# Patient Record
Sex: MALE | Race: WHITE | URBAN - METROPOLITAN AREA
[De-identification: names, ages, dates, MRNs, and addresses within clinical notes are randomized per-mention and may not be internally consistent; named-entity substitution may affect disease eponyms.]

---

## 2020-09-29 ENCOUNTER — HISTORICAL (OUTPATIENT)
Dept: ADMINISTRATIVE | Facility: HOSPITAL | Age: 50
End: 2020-09-29

## 2021-05-24 ENCOUNTER — HISTORICAL (OUTPATIENT)
Dept: RADIOLOGY | Facility: HOSPITAL | Age: 51
End: 2021-05-24

## 2021-10-26 ENCOUNTER — HISTORICAL (OUTPATIENT)
Dept: RADIOLOGY | Facility: HOSPITAL | Age: 51
End: 2021-10-26

## 2022-04-30 NOTE — OP NOTE
Patient:   Dane Mora             MRN: [id number]            FIN: [id number]               Age:   50 years     Sex:  Male     :  1970   Associated Diagnoses:   None   Author:   Wei Lopez MD      Preoperative Diagnosis: Cataract Right eye    Postoperative Diagnosis: Cataract Right eye    Procedure: Phacoemulsification with intaocular lens implantations Right eye    Surgeon: Wei Lopez MD    Assistant: Luis Luu [facility]     Anestheisa: MAC    Complications: None    The patient was brought into the operating suite, where the patient was correctly identified as was the operative eye via timeout.  The patient was prepped and draped in a sterile ophthalmic fashion.  A lid speculum was placed in the operative eye and the microscope was brought into place.  A 1.0mm paracentesis was then made at (_12_) o'clock.  The anterior chamber was filled with Endocoat.  A (temporal) clear corneal incision was made with a 2.4 mm keratome.  A 6 mm corneal marking ring was used to ganga the cornea centered over the visual axis.  A 5.00 mm continuous curvilinear capsulorhexis was fashioned using a cystotome and microcapsular forceps.  Hydrodissection and hydrodelineation was performed with upreserved 1% Xylocaine.  The nucleus was then phacoemulsified with the Abbott phacoemulsification hand-piece with a total of (_68_) EFX.  The cortex was then removed with the I/A hand-piece. The lens model (__[id number]__) with a power of (__19.5__) was placed in the capsular bag.  The Helon was then removed from the eye with the I/A hand piece.  The anterior chamber was inflated and the wounds were hydrated with BSS.  The wounds were checked with Weck-Althea sponges and found to be watertight.  The lid speculum was removed and topical antibiotics were placed on the operative eye.  The patient was brought to PACU in good condition.        2020 @ [facility]

## 2024-10-20 ENCOUNTER — HOSPITAL ENCOUNTER (EMERGENCY)
Facility: HOSPITAL | Age: 54
Discharge: HOME | End: 2024-10-20
Attending: EMERGENCY MEDICINE
Payer: COMMERCIAL

## 2024-10-20 VITALS
DIASTOLIC BLOOD PRESSURE: 76 MMHG | TEMPERATURE: 98 F | BODY MASS INDEX: 25.92 KG/M2 | HEART RATE: 89 BPM | HEIGHT: 69 IN | RESPIRATION RATE: 18 BRPM | SYSTOLIC BLOOD PRESSURE: 131 MMHG | OXYGEN SATURATION: 99 % | WEIGHT: 175 LBS

## 2024-10-20 DIAGNOSIS — H53.8 BLURRY VISION, LEFT EYE: Primary | ICD-10-CM

## 2024-10-20 DIAGNOSIS — H43.392 VITREOUS FLOATERS OF LEFT EYE: ICD-10-CM

## 2024-10-20 PROCEDURE — 25000003 PHARM REV CODE 250

## 2024-10-20 PROCEDURE — 99283 EMERGENCY DEPT VISIT LOW MDM: CPT

## 2024-10-20 RX ORDER — PROPARACAINE HYDROCHLORIDE 5 MG/ML
1 SOLUTION/ DROPS OPHTHALMIC
Status: COMPLETED | OUTPATIENT
Start: 2024-10-20 | End: 2024-10-20

## 2024-10-20 RX ADMIN — FLUORESCEIN SODIUM 1 EACH: 1 STRIP OPHTHALMIC at 02:10

## 2024-10-20 RX ADMIN — PROPARACAINE HYDROCHLORIDE 1 DROP: 5 SOLUTION/ DROPS OPHTHALMIC at 02:10

## 2024-10-20 NOTE — DISCHARGE INSTRUCTIONS
See ophthalmologist first thing tomorrow morning for concerns of retinal detachment v arterial occlusion v venous occlusion. Return with new or worsening symptoms.

## 2024-10-20 NOTE — ED PROVIDER NOTES
"Encounter Date: 10/20/2024       History     Chief Complaint   Patient presents with    Eye Problem     Pt presented to ED per POV with c/o black spots in left eye. Pt denies injury or truama. Pt stated "I think I have a detached retina, I keep seeing a black spot, but it comes and goes". Pt denies headache, dizziness, or numbness and tingling to extremities. Pt stated hx of Glaucoma. No abnormalities noted. Pupils PERRLA.      HPI  Review of patient's allergies indicates:  No Known Allergies  Past Medical History:   Diagnosis Date    Cataracts, bilateral     Hypertension      Past Surgical History:   Procedure Laterality Date    LEG Left     SHATTERED TIBIA     No family history on file.  Social History     Tobacco Use    Smoking status: Never     Passive exposure: Never    Smokeless tobacco: Never   Substance Use Topics    Alcohol use: Yes     Comment: OCCASIONAL    Drug use: Never     Review of Systems    Physical Exam     Initial Vitals [10/20/24 1418]   BP Pulse Resp Temp SpO2   137/79 100 18 98.4 °F (36.9 °C) 100 %      MAP       --         Physical Exam    ED Course   Procedures  Labs Reviewed - No data to display       Imaging Results    None          Medications   proparacaine 0.5 % ophthalmic solution 1 drop (1 drop Both Eyes Given 10/20/24 1445)   fluorescein ophthalmic strip 1 each (1 each Both Eyes Given 10/20/24 1445)     Medical Decision Making  Patient is a 54-year-old male who presents with left eye "black spot".  States that it just started couple hours prior to arrival.  Has history of floaters for many years, however this fluid is black and crescent shape which is atypical for him.  Denies curtain coming down over his eyes, he lost.  States that he has associated sensation of "dry eye".  Used Visine drops just prior to arrival which did help the symptoms.  Otherwise denies eye pain.  States that the vision eye is also slightly blurry.  Denies headache, dizziness, weakness, slurred speech.  Has " history of bilateral cataract repair.  History of Lasix approximately 20 years ago.  Does not wear corrective lenses.  Denies fevers chills.    Right visual acuity 20/15  Left visual acuity 20/13  Bilateral visual acuity 20/25    Pt is not toxic appearing, in no acute distress. No evidence of ulcer or abrasion on flourescein stain. Visual acuity without large discrepancy. Discussed case with Dr. Hartley with [place] who advised follow-up with ophthalmology close to pt home for further evaluation and treatment in the morning tomorrow. Did not feel that transfer was necessary at this time. Discussed findings with pt including strict ED precautions. Pt will follow up with [facility] with Dr. Hall tomorrow morning at 8am. Pt expressed understanding and was in agreement with the plan.     Amount and/or Complexity of Data Reviewed  Discussion of management or test interpretation with external provider(s): Discussed patient with Dr. Mancilla with face-to-face with the patient.   Discussed pt case with Dr. Hartley with [place] who advised follow-up with ophthalmology close to pt home for further evaluation and treatment in the morning tomorrow. Did not feel that transfer was necessary at this time. Discussed with Dr. Mancilla.     Risk  Prescription drug management.      Additional MDM:   Differential Diagnosis:   Other: The following diagnoses were also considered and will be evaluated: Corneal abrasion, Retinal detachment and Preseptal cellulitis.                                   Clinical Impression:  Final diagnoses:  [H53.8] Blurry vision, left eye (Primary)  [H43.392] Vitreous floaters of left eye          ED Disposition Condition    Discharge Stable          ED Prescriptions    None       Follow-up Information       Follow up With Specialties Details Why Contact Info    Tristan Timmons, ALICIAP-C Family Medicine Call in 1 week  [address]  [place] [address]  [phone]                Karla Merchant PA  10/20/24 1713